# Patient Record
Sex: FEMALE | Race: WHITE | Employment: OTHER | URBAN - METROPOLITAN AREA
[De-identification: names, ages, dates, MRNs, and addresses within clinical notes are randomized per-mention and may not be internally consistent; named-entity substitution may affect disease eponyms.]

---

## 2017-06-21 PROBLEM — M65.312 TRIGGER FINGER OF LEFT THUMB: Status: ACTIVE | Noted: 2017-06-21

## 2017-12-29 PROBLEM — M48.062 SPINAL STENOSIS OF LUMBAR REGION WITH NEUROGENIC CLAUDICATION: Status: ACTIVE | Noted: 2017-12-29

## 2018-01-17 PROBLEM — M65.311 TRIGGER THUMB, RIGHT THUMB: Status: ACTIVE | Noted: 2018-01-17

## 2018-03-21 ENCOUNTER — HOSPITAL ENCOUNTER (OUTPATIENT)
Dept: GENERAL RADIOLOGY | Facility: HOSPITAL | Age: 83
Discharge: HOME | End: 2018-03-21
Attending: ORTHOPAEDIC SURGERY
Payer: MEDICARE

## 2018-03-21 DIAGNOSIS — M25.552 PAIN OF LEFT HIP JOINT: ICD-10-CM

## 2018-03-21 DIAGNOSIS — M16.12 PRIMARY OSTEOARTHRITIS OF LEFT HIP: ICD-10-CM

## 2018-03-21 PROCEDURE — 20610 DRAIN/INJ JOINT/BURSA W/O US: CPT | Performed by: ORTHOPAEDIC SURGERY

## 2018-03-21 PROCEDURE — 77002 NEEDLE LOCALIZATION BY XRAY: CPT | Performed by: ORTHOPAEDIC SURGERY

## 2018-03-21 RX ORDER — METHYLPREDNISOLONE ACETATE 80 MG/ML
INJECTION, SUSPENSION INTRA-ARTICULAR; INTRALESIONAL; INTRAMUSCULAR; SOFT TISSUE
Status: COMPLETED
Start: 2018-03-21 | End: 2018-03-21

## 2018-03-21 RX ADMIN — METHYLPREDNISOLONE ACETATE 80 MG: 80 INJECTION, SUSPENSION INTRA-ARTICULAR; INTRALESIONAL; INTRAMUSCULAR; SOFT TISSUE at 11:00:00

## 2018-03-21 NOTE — PROCEDURES
TECHNIQUE:  An injection was performed in the usual sterile manner after obtaining informed consent. PATIENT STATED HISTORY: (As transcribed by Technologist)  Patient has had ongoing pain in her left hip.   She had a right hip replacement and her right

## 2018-04-03 PROBLEM — M65.311 TRIGGER THUMB, RIGHT THUMB: Status: RESOLVED | Noted: 2018-01-17 | Resolved: 2018-04-03

## 2018-06-28 PROBLEM — M47.816 LUMBAR SPONDYLOSIS: Status: ACTIVE | Noted: 2018-06-28

## 2018-06-28 PROBLEM — M54.16 LUMBAR RADICULITIS: Status: ACTIVE | Noted: 2018-06-28

## 2018-06-28 PROBLEM — Z98.1 S/P LAMINECTOMY WITH SPINAL FUSION: Status: ACTIVE | Noted: 2018-06-28

## 2019-01-14 ENCOUNTER — APPOINTMENT (OUTPATIENT)
Dept: CT IMAGING | Facility: HOSPITAL | Age: 84
End: 2019-01-14
Attending: EMERGENCY MEDICINE
Payer: MEDICARE

## 2019-01-14 ENCOUNTER — HOSPITAL ENCOUNTER (OUTPATIENT)
Facility: HOSPITAL | Age: 84
Setting detail: OBSERVATION
Discharge: HOME HEALTH | End: 2019-01-15
Attending: EMERGENCY MEDICINE | Admitting: INTERNAL MEDICINE
Payer: MEDICARE

## 2019-01-14 DIAGNOSIS — S06.5X9A ACUTE SUBDURAL HEMATOMA (HCC): Primary | ICD-10-CM

## 2019-01-14 LAB
ALBUMIN SERPL-MCNC: 3.7 G/DL (ref 3.1–4.5)
ALBUMIN/GLOB SERPL: 1 {RATIO} (ref 1–2)
ALP LIVER SERPL-CCNC: 75 U/L (ref 55–142)
ALT SERPL-CCNC: 20 U/L (ref 14–54)
ANION GAP SERPL CALC-SCNC: 4 MMOL/L (ref 0–18)
APTT PPP: 28.2 SECONDS (ref 26.1–34.6)
AST SERPL-CCNC: 28 U/L (ref 15–41)
BASOPHILS # BLD AUTO: 0.03 X10(3) UL (ref 0–0.1)
BASOPHILS NFR BLD AUTO: 0.4 %
BILIRUB SERPL-MCNC: 0.4 MG/DL (ref 0.1–2)
BUN BLD-MCNC: 12 MG/DL (ref 8–20)
BUN/CREAT SERPL: 19.7 (ref 10–20)
CALCIUM BLD-MCNC: 9.5 MG/DL (ref 8.3–10.3)
CHLORIDE SERPL-SCNC: 103 MMOL/L (ref 101–111)
CO2 SERPL-SCNC: 30 MMOL/L (ref 22–32)
CREAT BLD-MCNC: 0.61 MG/DL (ref 0.55–1.02)
EOSINOPHIL # BLD AUTO: 0.08 X10(3) UL (ref 0–0.3)
EOSINOPHIL NFR BLD AUTO: 1.1 %
ERYTHROCYTE [DISTWIDTH] IN BLOOD BY AUTOMATED COUNT: 12.9 % (ref 11.5–16)
GLOBULIN PLAS-MCNC: 3.7 G/DL (ref 2.8–4.4)
GLUCOSE BLD-MCNC: 126 MG/DL (ref 70–99)
HCT VFR BLD AUTO: 38.3 % (ref 34–50)
HGB BLD-MCNC: 12.7 G/DL (ref 12–16)
IMMATURE GRANULOCYTE COUNT: 0.02 X10(3) UL (ref 0–1)
IMMATURE GRANULOCYTE RATIO %: 0.3 %
INR BLD: 0.97 (ref 0.9–1.1)
LYMPHOCYTES # BLD AUTO: 1.23 X10(3) UL (ref 0.9–4)
LYMPHOCYTES NFR BLD AUTO: 16.8 %
M PROTEIN MFR SERPL ELPH: 7.4 G/DL (ref 6.4–8.2)
MCH RBC QN AUTO: 32.3 PG (ref 27–33.2)
MCHC RBC AUTO-ENTMCNC: 33.2 G/DL (ref 31–37)
MCV RBC AUTO: 97.5 FL (ref 81–100)
MONOCYTES # BLD AUTO: 0.59 X10(3) UL (ref 0.1–1)
MONOCYTES NFR BLD AUTO: 8 %
NEUTROPHIL ABS PRELIM: 5.39 X10 (3) UL (ref 1.3–6.7)
NEUTROPHILS # BLD AUTO: 5.39 X10(3) UL (ref 1.3–6.7)
NEUTROPHILS NFR BLD AUTO: 73.4 %
OSMOLALITY SERPL CALC.SUM OF ELEC: 285 MOSM/KG (ref 275–295)
PLATELET # BLD AUTO: 180 10(3)UL (ref 150–450)
POTASSIUM SERPL-SCNC: 3.9 MMOL/L (ref 3.6–5.1)
PSA SERPL DL<=0.01 NG/ML-MCNC: 13.3 SECONDS (ref 12.4–14.7)
RBC # BLD AUTO: 3.93 X10(6)UL (ref 3.8–5.1)
RED CELL DISTRIBUTION WIDTH-SD: 46.2 FL (ref 35.1–46.3)
SODIUM SERPL-SCNC: 137 MMOL/L (ref 136–144)
WBC # BLD AUTO: 7.3 X10(3) UL (ref 4–13)

## 2019-01-14 PROCEDURE — 72125 CT NECK SPINE W/O DYE: CPT | Performed by: EMERGENCY MEDICINE

## 2019-01-14 PROCEDURE — 70450 CT HEAD/BRAIN W/O DYE: CPT | Performed by: EMERGENCY MEDICINE

## 2019-01-14 RX ORDER — FLUOROMETHOLONE 0.1 %
1 SUSPENSION, DROPS(FINAL DOSAGE FORM)(ML) OPHTHALMIC (EYE) NIGHTLY
Status: DISCONTINUED | OUTPATIENT
Start: 2019-01-14 | End: 2019-01-15

## 2019-01-14 RX ORDER — GABAPENTIN 300 MG/1
300 CAPSULE ORAL 3 TIMES DAILY
COMMUNITY
End: 2019-01-23

## 2019-01-14 RX ORDER — OXYBUTYNIN CHLORIDE 5 MG/1
10 TABLET ORAL DAILY
COMMUNITY
End: 2019-07-17

## 2019-01-14 RX ORDER — METOCLOPRAMIDE HYDROCHLORIDE 5 MG/ML
5 INJECTION INTRAMUSCULAR; INTRAVENOUS EVERY 8 HOURS PRN
Status: DISCONTINUED | OUTPATIENT
Start: 2019-01-14 | End: 2019-01-15

## 2019-01-14 RX ORDER — ATORVASTATIN CALCIUM 10 MG/1
10 TABLET, FILM COATED ORAL NIGHTLY
Status: DISCONTINUED | OUTPATIENT
Start: 2019-01-14 | End: 2019-01-15

## 2019-01-14 RX ORDER — MECLIZINE HCL 12.5 MG/1
12.5 TABLET ORAL 3 TIMES DAILY PRN
COMMUNITY
End: 2019-03-25

## 2019-01-14 RX ORDER — SODIUM CHLORIDE 9 MG/ML
INJECTION, SOLUTION INTRAVENOUS CONTINUOUS
Status: DISCONTINUED | OUTPATIENT
Start: 2019-01-14 | End: 2019-01-15

## 2019-01-14 RX ORDER — BENAZEPRIL HYDROCHLORIDE 20 MG/1
20 TABLET ORAL DAILY
COMMUNITY
End: 2019-06-18

## 2019-01-14 RX ORDER — GABAPENTIN 300 MG/1
300 CAPSULE ORAL 3 TIMES DAILY
Status: DISCONTINUED | OUTPATIENT
Start: 2019-01-14 | End: 2019-01-15

## 2019-01-14 RX ORDER — ACETAMINOPHEN 325 MG/1
650 TABLET ORAL EVERY 6 HOURS PRN
Status: DISCONTINUED | OUTPATIENT
Start: 2019-01-14 | End: 2019-01-15

## 2019-01-14 RX ORDER — FLUOROMETHOLONE 0.1 %
1 SUSPENSION, DROPS(FINAL DOSAGE FORM)(ML) OPHTHALMIC (EYE) NIGHTLY
COMMUNITY
End: 2020-08-26

## 2019-01-14 RX ORDER — LOVASTATIN 40 MG/1
40 TABLET ORAL NIGHTLY
COMMUNITY
End: 2019-03-21

## 2019-01-14 RX ORDER — AMLODIPINE BESYLATE 10 MG/1
10 TABLET ORAL DAILY
COMMUNITY
End: 2019-03-21

## 2019-01-14 RX ORDER — OXYBUTYNIN CHLORIDE 5 MG/1
10 TABLET ORAL DAILY
Status: DISCONTINUED | OUTPATIENT
Start: 2019-01-15 | End: 2019-01-15

## 2019-01-14 RX ORDER — ONDANSETRON 2 MG/ML
4 INJECTION INTRAMUSCULAR; INTRAVENOUS EVERY 6 HOURS PRN
Status: DISCONTINUED | OUTPATIENT
Start: 2019-01-14 | End: 2019-01-15

## 2019-01-14 NOTE — ED PROVIDER NOTES
Patient Seen in: [facility] Emergency Department    History   Patient presents with:  Fall (musculoskeletal, neurologic)    Stated Complaint: Mechanical fall, +hit head, denies loc, denies n/v, denies blood thinners    HPI    Patient is a 89-year-ol 7/2/2018    Performed by Kaitlyn Campos MD at [address] [facility],[address] N/A 11/9/2018    Performed by Kaitlyn Campos MD at [address] [place] [address] 7/30/2018    Performed by Bonilla Rangel MD at [address] [facility] normal. Pupils are equal, round, and reactive to light. Neck: Normal range of motion. Neck supple. No midline cervical spine tenderness to palpation. Cardiovascular: Normal rate, regular rhythm and normal heart sounds.    Pulmonary/Chest: Effort tyrese Updated 6:05 PM.  CT unfortunately demonstrates a 3 mm acute subdural hematoma on the right side. There is no shift. Patient will need to be admitted for close observation and repeat scan in the morning. Discussed with her and family at bedside.

## 2019-01-14 NOTE — ED INITIAL ASSESSMENT (HPI)
Pt reports her cane slipped out from her and she fell hitting her head. No loc. Pt reports hitting the front of her head. Pt fell forward. Aox4. Walks with cane.

## 2019-01-15 ENCOUNTER — APPOINTMENT (OUTPATIENT)
Dept: CT IMAGING | Facility: HOSPITAL | Age: 84
End: 2019-01-15
Attending: NURSE PRACTITIONER
Payer: MEDICARE

## 2019-01-15 VITALS
DIASTOLIC BLOOD PRESSURE: 53 MMHG | OXYGEN SATURATION: 96 % | WEIGHT: 112.44 LBS | BODY MASS INDEX: 23 KG/M2 | HEART RATE: 63 BPM | TEMPERATURE: 99 F | RESPIRATION RATE: 17 BRPM | SYSTOLIC BLOOD PRESSURE: 127 MMHG

## 2019-01-15 LAB
ANION GAP SERPL CALC-SCNC: 6 MMOL/L (ref 0–18)
BASOPHILS # BLD AUTO: 0.01 X10(3) UL (ref 0–0.1)
BASOPHILS # BLD AUTO: 0.02 X10(3) UL (ref 0–0.1)
BASOPHILS NFR BLD AUTO: 0.2 %
BASOPHILS NFR BLD AUTO: 0.4 %
BUN BLD-MCNC: 11 MG/DL (ref 8–20)
BUN/CREAT SERPL: 25.6 (ref 10–20)
CALCIUM BLD-MCNC: 7.8 MG/DL (ref 8.3–10.3)
CHLORIDE SERPL-SCNC: 107 MMOL/L (ref 101–111)
CHOLEST SMN-MCNC: 122 MG/DL (ref ?–200)
CO2 SERPL-SCNC: 28 MMOL/L (ref 22–32)
CREAT BLD-MCNC: 0.43 MG/DL (ref 0.55–1.02)
EOSINOPHIL # BLD AUTO: 0.09 X10(3) UL (ref 0–0.3)
EOSINOPHIL # BLD AUTO: 0.1 X10(3) UL (ref 0–0.3)
EOSINOPHIL NFR BLD AUTO: 1.7 %
EOSINOPHIL NFR BLD AUTO: 2.1 %
ERYTHROCYTE [DISTWIDTH] IN BLOOD BY AUTOMATED COUNT: 13.2 % (ref 11.5–16)
ERYTHROCYTE [DISTWIDTH] IN BLOOD BY AUTOMATED COUNT: 13.2 % (ref 11.5–16)
EST. AVERAGE GLUCOSE BLD GHB EST-MCNC: 126 MG/DL (ref 68–126)
GLUCOSE BLD-MCNC: 109 MG/DL (ref 70–99)
HBA1C MFR BLD HPLC: 6 % (ref ?–5.7)
HCT VFR BLD AUTO: 29.1 % (ref 34–50)
HCT VFR BLD AUTO: 32.5 % (ref 34–50)
HDLC SERPL-MCNC: 74 MG/DL (ref 40–59)
HGB BLD-MCNC: 11 G/DL (ref 12–16)
HGB BLD-MCNC: 9.8 G/DL (ref 12–16)
IMMATURE GRANULOCYTE COUNT: 0.02 X10(3) UL (ref 0–1)
IMMATURE GRANULOCYTE COUNT: 0.02 X10(3) UL (ref 0–1)
IMMATURE GRANULOCYTE RATIO %: 0.4 %
IMMATURE GRANULOCYTE RATIO %: 0.4 %
LDLC SERPL CALC-MCNC: 42 MG/DL (ref ?–100)
LYMPHOCYTES # BLD AUTO: 1.4 X10(3) UL (ref 0.9–4)
LYMPHOCYTES # BLD AUTO: 1.67 X10(3) UL (ref 0.9–4)
LYMPHOCYTES NFR BLD AUTO: 29.1 %
LYMPHOCYTES NFR BLD AUTO: 31.5 %
MCH RBC QN AUTO: 33.3 PG (ref 27–33.2)
MCH RBC QN AUTO: 34 PG (ref 27–33.2)
MCHC RBC AUTO-ENTMCNC: 33.7 G/DL (ref 31–37)
MCHC RBC AUTO-ENTMCNC: 33.8 G/DL (ref 31–37)
MCV RBC AUTO: 100.3 FL (ref 81–100)
MCV RBC AUTO: 99 FL (ref 81–100)
MONOCYTES # BLD AUTO: 0.68 X10(3) UL (ref 0.1–1)
MONOCYTES # BLD AUTO: 0.71 X10(3) UL (ref 0.1–1)
MONOCYTES NFR BLD AUTO: 13.4 %
MONOCYTES NFR BLD AUTO: 14.1 %
NEUTROPHIL ABS PRELIM: 2.6 X10 (3) UL (ref 1.3–6.7)
NEUTROPHIL ABS PRELIM: 2.8 X10 (3) UL (ref 1.3–6.7)
NEUTROPHILS # BLD AUTO: 2.6 X10(3) UL (ref 1.3–6.7)
NEUTROPHILS # BLD AUTO: 2.8 X10(3) UL (ref 1.3–6.7)
NEUTROPHILS NFR BLD AUTO: 52.6 %
NEUTROPHILS NFR BLD AUTO: 54.1 %
NONHDLC SERPL-MCNC: 48 MG/DL (ref ?–130)
OSMOLALITY SERPL CALC.SUM OF ELEC: 292 MOSM/KG (ref 275–295)
PLATELET # BLD AUTO: 134 10(3)UL (ref 150–450)
PLATELET # BLD AUTO: 154 10(3)UL (ref 150–450)
POTASSIUM SERPL-SCNC: 3.6 MMOL/L (ref 3.6–5.1)
POTASSIUM SERPL-SCNC: 4.4 MMOL/L (ref 3.6–5.1)
RBC # BLD AUTO: 2.94 X10(6)UL (ref 3.8–5.1)
RBC # BLD AUTO: 3.24 X10(6)UL (ref 3.8–5.1)
RED CELL DISTRIBUTION WIDTH-SD: 47.2 FL (ref 35.1–46.3)
RED CELL DISTRIBUTION WIDTH-SD: 49.1 FL (ref 35.1–46.3)
SODIUM SERPL-SCNC: 141 MMOL/L (ref 136–144)
TRIGL SERPL-MCNC: 28 MG/DL (ref 30–149)
TROPONIN I SERPL-MCNC: <0.046 NG/ML (ref ?–0.05)
VLDLC SERPL CALC-MCNC: 6 MG/DL (ref 0–30)
WBC # BLD AUTO: 4.8 X10(3) UL (ref 4–13)
WBC # BLD AUTO: 5.3 X10(3) UL (ref 4–13)

## 2019-01-15 PROCEDURE — 99291 CRITICAL CARE FIRST HOUR: CPT | Performed by: NURSE PRACTITIONER

## 2019-01-15 PROCEDURE — 99205 OFFICE O/P NEW HI 60 MIN: CPT | Performed by: OTHER

## 2019-01-15 PROCEDURE — 70450 CT HEAD/BRAIN W/O DYE: CPT | Performed by: NURSE PRACTITIONER

## 2019-01-15 RX ORDER — DOCUSATE SODIUM 100 MG/1
100 CAPSULE, LIQUID FILLED ORAL 2 TIMES DAILY
Status: DISCONTINUED | OUTPATIENT
Start: 2019-01-15 | End: 2019-01-15

## 2019-01-15 RX ORDER — SENNA PLUS 8.6 MG/1
8.6 TABLET ORAL 2 TIMES DAILY
Qty: 30 TABLET | Refills: 0 | Status: SHIPPED | OUTPATIENT
Start: 2019-01-15 | End: 2019-04-05

## 2019-01-15 RX ORDER — LORAZEPAM 2 MG/ML
1 INJECTION INTRAMUSCULAR
Status: DISCONTINUED | OUTPATIENT
Start: 2019-01-15 | End: 2019-01-15

## 2019-01-15 RX ORDER — LABETALOL HYDROCHLORIDE 5 MG/ML
10 INJECTION, SOLUTION INTRAVENOUS EVERY 10 MIN PRN
Status: DISCONTINUED | OUTPATIENT
Start: 2019-01-15 | End: 2019-01-15

## 2019-01-15 RX ORDER — SENNOSIDES 8.6 MG
8.6 TABLET ORAL 2 TIMES DAILY
Status: DISCONTINUED | OUTPATIENT
Start: 2019-01-15 | End: 2019-01-15

## 2019-01-15 RX ORDER — BISACODYL 10 MG
10 SUPPOSITORY, RECTAL RECTAL
Status: DISCONTINUED | OUTPATIENT
Start: 2019-01-15 | End: 2019-01-15

## 2019-01-15 RX ORDER — SODIUM PHOSPHATE, DIBASIC AND SODIUM PHOSPHATE, MONOBASIC 7; 19 G/133ML; G/133ML
1 ENEMA RECTAL ONCE AS NEEDED
Status: DISCONTINUED | OUTPATIENT
Start: 2019-01-15 | End: 2019-01-15

## 2019-01-15 RX ORDER — POLYETHYLENE GLYCOL 3350 17 G/17G
17 POWDER, FOR SOLUTION ORAL DAILY PRN
Status: DISCONTINUED | OUTPATIENT
Start: 2019-01-15 | End: 2019-01-15

## 2019-01-15 RX ORDER — POTASSIUM CHLORIDE 20 MEQ/1
40 TABLET, EXTENDED RELEASE ORAL EVERY 4 HOURS
Status: COMPLETED | OUTPATIENT
Start: 2019-01-15 | End: 2019-01-15

## 2019-01-15 RX ORDER — PHENYLEPHRINE HCL IN 0.9% NACL 50MG/250ML
PLASTIC BAG, INJECTION (ML) INTRAVENOUS CONTINUOUS PRN
Status: DISCONTINUED | OUTPATIENT
Start: 2019-01-15 | End: 2019-01-15

## 2019-01-15 NOTE — DISCHARGE SUMMARY
General Medicine Discharge Summary     Patient ID:  Yara Rodriguez May  80year old  11/17/1925    Admit date: 1/14/2019    Discharge date and time: 1/15/2019  4:00 PM     Attending Physician: Huan Musa MD    Primary Care Physician: Roxy Montesinos MD daily., Normal, Disp-30 tablet, R-0      CONTINUE these medications which have NOT CHANGED    fluorometholone 0.1 % Ophthalmic Suspension  Place 1 drop into the right eye nightly., Historical    Oxybutynin Chloride 5 MG Oral Tab  Take 10 mg by mouth daily.

## 2019-01-15 NOTE — PROGRESS NOTES
[facility] Hospitalist Progress Note     [facility]      SUBJECTIVE:  Patient feeling well this morning  No new headaches or weakness  No new vision changes    OBJECTIVE:  Temp:  [97.1 °F (36.2 °C)-98.4 °F (36.9 °C)] 98.4 °F (36.9 °C)  Pulse:  [59-77] 67 FALL    FINDINGS:   Right frontal subdural hematoma appears smaller, now probably less than 2 mm or less. No new intracranial bleed. Redemonstration atrophy and chronic white matter disease. Decrease in size right posterior scalp hematoma.       Chris Linger white matter ischemic change. The critical value results were called to Dr. Graciela Peter at 20 0 2 hours on  1/14/2019. Result read back was performed.     Dictated by: Krys Bailey MD on 1/14/2019 at 17:57     Approved by: Krys Bailey MD Barb Hoffman MD             Result Date: 1/14/2019  PROCEDURE:  CT SPINE CERVICAL (CPT=72125)  COMPARISON:  None.   INDICATIONS:  Mechanical fall, +hit head, denies loc, denies n/v, denies blood thinners  TECHNIQUE:  Noncontrast CT scanning of the cer (MIRALAX) powder packet 17 g 17 g Oral Daily PRN   magnesium hydroxide (MILK OF MAGNESIA) 400 MG/5ML suspension 30 mL 30 mL Oral Daily PRN   bisacodyl (DULCOLAX) rectal suppository 10 mg 10 mg Rectal Daily PRN   FLEET ENEMA (FLEET) 7-19 GM/118ML enema 133

## 2019-01-15 NOTE — OCCUPATIONAL THERAPY NOTE
OCCUPATIONAL THERAPY EVALUATION - INPATIENT     Room Number: 4014/4455-P  Evaluation Date: 1/15/2019  Type of Evaluation: Initial  Presenting Problem: fall, R SD hematoma    Physician Order: IP Consult to Occupational Therapy  Reason for Therapy: ADL/IADL • HIP REPLACEMENT SURGERY  7/ 2009    R   • INTRAOPERATIVE NEURO MONITORING N/A 11/5/2013    Performed by Dottie Dumont MD at [facility] MAIN OR   • LUMBAR / TRANSFORAMINAL EPIDURAL STEROID INJECTION N/A 7/2/2018    Performed by Dayanara Park MD at [facility] WHITE about that, but I'm proud of it! \"      Patient self-stated goal is to go home     OBJECTIVE  Precautions: Bed/chair alarm(-160, leg length discrepancy- heel lift)  Fall Risk: High fall risk    WEIGHT BEARING RESTRICTION  Weight Bearing Restriction: person does the patient currently need…  -   Putting on and taking off regular lower body clothing?: A Little  -   Bathing (including washing, rinsing, drying)?: A Little  -   Toileting, which includes using toilet, bedpan or urinal? : A Little  -   Puttin rationale. Daughter stating she thinks above deficits are related to pt not having glasses/hearing aides present for evaluation. Pt unable to state why she didn't want her glasses for eval; states she always wears them at home.  Explained in detail findings safety awareness, noticing/responding to errors, problem solving, balance.  These deficits impact the patient’s ability to participate in ADL, transfers, instrumental activities of daily living, rest and sleep, community mobility, leisure and social partici will transfer form stand to sit:  with modified independent  Patient will transfer to toilet:  with modified independent    Additional Goals:  Pt will complete trailmaking test

## 2019-01-15 NOTE — PHYSICAL THERAPY NOTE
PHYSICAL THERAPY EVALUATION - INPATIENT     Room Number: 0777/7231-U  Evaluation Date: 1/15/2019  Type of Evaluation: Initial  Physician Order: PT Eval and Treat    Presenting Problem: s/p fall, R SDH  Reason for Therapy: Mobility Dysfunction and Dis LUMBAR EPIDURAL N/A 7/30/2018    Performed by Evette Lake MD at [address] 7/16/2018    Performed by Toshia Short MD at [address]   • OTHER      13 biopsies on both breasts   • OTHER S for safety  · Awareness of Errors:  decreased awareness of errors     RANGE OF MOTION AND STRENGTH ASSESSMENT  Upper extremity ROM and strength- see OT eval    Lower extremity ROM is within functional limits     Lower extremity strength is within functiona Pt also requires cueing to zip shoes prior to getting up. Pt unable to recall that she left unzipped. Pt sit-stand sans AD with MIN assist. Pt took a few tentative steps with MIN assist for balance. Pt reaching for objects to hold onto.  Pt provided RW for include [facility]. Based on this evaluation, patient's clinical presentation is stable and overall the evaluation complexity is considered moderate.  These impairments and comorbidities manifest themselves as functional limitations in independent bed mobility,

## 2019-01-15 NOTE — H&P
[facility] Hospitalist H&P       CC: Patient presents with:  Fall (musculoskeletal, neurologic)       PCP: Danial Benavidez MD    History of Present Illness:  Ms. Burr is a 81 yo female with PMH of GERD, HLD, HTN, osteoprososis who presented after a fall in a park MAIN OR   • LUMBAR / TRANSFORAMINAL EPIDURAL STEROID INJECTION N/A 7/2/2018    Performed by Dona Askew MD at [address] [facility],[address] N/A 11/9/2018    Performed by Dona Askew MD at [address]    Wt Readings from Last 3 Encounters:  11/27/18 : 116 lb (52.6 kg)  11/08/18 : 116 lb (52.6 kg)  10/04/18 : 130 lb (59 kg)      Wt Readings from Last 6 Encounters:  11/27/18 : 116 lb (52.6 kg)  11/08/18 : 116 lb (52.6 kg)  10/04/18 : 130 lb (59 kg) midline shift. The basal cisterns are patent. Moderate calcification of the skull base noted. Moderate chronic deep white matter ischemic change in the subcortical white matter of the cerebrum. No acute territorial infarction.   Chronic ischemic change of the cervical lordosis. There is facet arthropathy most severe C2-3 through C4-5 bilaterally. Normal C1-2 articulation. No fracture. CERVICAL DISC LEVELS: There are small annular bulging discs C2-3 through C4-5. No disc herniation or canal stenosis.

## 2019-01-15 NOTE — HOME CARE LIAISON
Met with patient and daughter at the bedside. Agreeable to [facility] services, RN/PT/OT. All questions addressed and answered. Brochure provided.

## 2019-01-15 NOTE — CM/SW NOTE
MSW received protocol order to verify support and assess for dc needs. Per RN, patient is independent and will be discharged home today. No dc planning needs at this time.     Jennifer Sanchez LCSW

## 2019-01-15 NOTE — SLP NOTE
ADULT SWALLOWING EVALUATION    ASSESSMENT    ASSESSMENT/OVERALL IMPRESSION:  Patient seen for swallowing assessment per protocol. Patient admitted after sustaining fall and hitting her head. Imaging revealed right SDH.   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

## 2019-01-15 NOTE — CONSULTS
[facility]  Neurocritical Care       Name: Rohith Burr  MRN: [id number]  Admission Date/Time: 1/14/2019  4:43 PM  Primary Care Provider:  Abraham Narayan MD        Reason for Consultation:   SDH    HPI:   Patient is a 80year old woman w 11/10/2010      Past Medical History:   Diagnosis Date   • Allergic rhinitis, cause unspecified 1/15/2012   • Anemia 1/13/2012   • Anesthesia complication    • BACK PAIN    • Cancer [facility])    • Cyst and pseudocyst of pancreas- on [email or web address] [facility] 9/27/ HISTORY      procedure for spinal stenosis (Dr Reggie Steward)   • POSTERIOR LUMBAR LAMINECT SPINAL FUS W/INSTR 1 LEV N/A 11/5/2013    Performed by Seb Ayala MD at [facility] MAIN OR   • SPINE SURGERY PROCEDURE UNLISTED      noninvasive lumbar surgery   • TOTAL HI [DISCONTINUED] Cranberry-Vitamin C-Vitamin E 140-100-3 MG-MG-UNIT Oral Cap Take 1 tablet by mouth daily. Disp:  Rfl:  1/14/2019 at 0900   [DISCONTINUED] omega-3 fatty acids (FISH OIL) 1000 MG Oral Cap Take 1,000 mg by mouth 2 (two) times daily.  Disp: PRN   bisacodyl (DULCOLAX) rectal suppository 10 mg 10 mg Rectal Daily PRN   FLEET ENEMA (FLEET) 7-19 GM/118ML enema 133 mL 1 enema Rectal Once PRN   LORazepam (ATIVAN) injection 1 mg 1 mg Intravenous Q15 Min PRN   Senna (SENOKOT) tab TABS 8.6 mg 8.6 mg Or subjective weakness  LLE 5/5 throughout  RLE 5/5 throughout  Sensory exam normal to fine touch, temperature and position  Coordination finger to nose normal bilaterally  Gait deferred    Diagnostics:   Ct Brain Or Head (73747)    Result Date: 1/15/2019  CA cisterns are patent. Moderate calcification of the skull base noted. Moderate chronic deep white matter ischemic change in the subcortical white matter of the cerebrum. No acute territorial infarction. Chronic ischemic change in the anterior leah.   No is straightening of the cervical lordosis. There is facet arthropathy most severe C2-3 through C4-5 bilaterally. Normal C1-2 articulation. No fracture. CERVICAL DISC LEVELS: There are small annular bulging discs C2-3 through C4-5.   No disc herniation o narrowing C2-3 through C5-6. The neural foramina are patent. CONCLUSION:  Mild degenerative disc disease. Findings most severe C2-3 through C4-5. No acute fracture.     Dictated by: Renetta Ntetles MD on 1/14/2019 at 18:03     Approved by: Eveline Layton Prophylaxis:  - SCD’s  - No chemical prophylaxis indicated  A total of 115 minutes of care time (exclusive of billable procedures) was administered.  This involved direct patient intervention, complex decision making, and/or extensive discussions with the p

## 2019-01-15 NOTE — CONSULTS
[facility]  Neurosurgery Consult    Dallin Anthony May Patient Status:  Observation    1925 MRN [id number]   [facility] 6NE-A Attending Nieves Graham MD   Hosp Day # 0 PCP Cynthia Moreno MD     REASON FOR CONSULTATION:  SDH • COLONOSCOPY  2008    ild sigmoid diverticulosis and hemorrhoids.    • HAND/FINGER SURGERY UNLISTED Left 10/2017--Dr. Marv Lan    thumb trigger finger release   • HIP REPLACEMENT SURGERY  7/ 2009    R   • INTRAOPERATIVE NEURO MONITORING N/A 11/5/2013    Pe nightly. Disp:  Rfl:  1/13/2019 at 2000   Oxybutynin Chloride 5 MG Oral Tab Take 10 mg by mouth daily. Disp:  Rfl:  1/14/2019 at 0900   AmLODIPine Besylate 10 MG Oral Tab Take 10 mg by mouth daily.  Disp:  Rfl:  1/14/2019 at 0900   gabapentin 300 MG Oral Ca injection 10 mg 10 mg Intravenous Q10 Min PRN   niCARdipine HCl in NaCl (CARDENE) 20 mg/200 ml premix infusion 5-15 mg/hr Intravenous Continuous PRN   phenylephrine in NaCl (SHIRA-SYNEPHRINE) 50 mg/250 ml premix infusion SOLN 100-200 mcg/min Intravenous Cont DATA: Lab Results   Component Value Date    WBC 5.3 01/15/2019    HGB 9.8 01/15/2019    HCT 29.1 01/15/2019    .0 01/15/2019    CREATSERUM 0.43 01/15/2019    BUN 11 01/15/2019     01/15/2019    K 3.6 01/15/2019     01/15/2019    CO2 28.0 through C4-5 bilaterally. Normal C1-2 articulation. No fracture. CERVICAL DISC LEVELS:  There are small annular bulging discs C2-3 through C4-5. No disc herniation or canal stenosis. Mild posterior disc space narrowing C2-3 through C5-6.   The neura

## 2019-01-15 NOTE — CM/SW NOTE
MSW met with the patient and daughter at bedside to discuss PT/OT recommendations for KEVIN. The patient is currently under observation and would be responsible for the cost.  Patient and family are not agreeable to self pay.   They are agreeable to [facility] serv

## 2019-01-15 NOTE — PROGRESS NOTES
[facility]  Neurocritical Care APRN Progress Note    NAME: Jade Burr - ROOM: 53/6566-H - MRN: [id number] - Age: 80year old - :1925    History Of Present Illness:  Jade Burr is a 80year old female with PMHx significant • Diabetes Father    • Hypertension Mother    • Other (Other) Daughter          of neck aneurysm at 47   • Cancer Sister 79        Leukemia       Review of Systems:   A comprehensive 10 point review of systems was completed.   Pertinent positives and lobes.  No midline shift. 2.  Moderate size right parietal scalp hematoma. Moderate chronic deep white matter ischemic change. The critical value results were called to Dr. William Cote at 20 0 2 hours on  1/14/2019. Result read back was performed.     Dict Easton Bowman MD      Dictated by: Easton Bowman MD on 1/14/2019 at 18:27     Approved by: Easton Bowman MD             Result Date: 1/14/2019  CONCLUSION:  Mild degenerative disc disease. Findings most severe C2-3 through C4-5.   No acute f

## 2019-01-15 NOTE — PLAN OF CARE
NEUROLOGICAL - ADULT    • Achieves stable or improved neurological status Adequate for Discharge          Impaired Functional Mobility    • Achieve highest/safest level of mobility/gait Progressing          NEUROLOGICAL - ADULT    • Achieves stable or impr

## 2019-02-06 ENCOUNTER — HOSPITAL ENCOUNTER (OUTPATIENT)
Dept: CT IMAGING | Facility: HOSPITAL | Age: 84
Discharge: HOME | End: 2019-02-06
Attending: NURSE PRACTITIONER
Payer: MEDICARE

## 2019-02-06 DIAGNOSIS — S06.5X9A ACUTE SUBDURAL HEMATOMA (HCC): ICD-10-CM

## 2019-02-06 PROCEDURE — 70450 CT HEAD/BRAIN W/O DYE: CPT | Performed by: NURSE PRACTITIONER

## 2019-02-07 NOTE — PROGRESS NOTES
Please call patient. Not sure who ordered the repeat CT scan. Would want to make sure that physician gets the records. Overall CT scan looks stable. Bleed is about 2mm in size. Once patient contacted, okay to release to patient.   Also, she likely need

## 2019-02-09 NOTE — PROGRESS NOTES
Pt informed of MD notes   States not sure who is the ordering MD  States will have her daughter call our office   Hold   Results released to my chart.

## 2019-02-11 NOTE — PROGRESS NOTES
Pt daughter calling states Neurology at the hospital did order the test   Pt daughter states dr Trevor Flowers Neurology have the results already   Pt have appt to follow up with Dr Janeth Jarquin to PCP

## 2019-02-19 ENCOUNTER — OFFICE VISIT (OUTPATIENT)
Dept: SURGERY | Facility: CLINIC | Age: 84
End: 2019-02-19
Payer: MEDICARE

## 2019-02-19 VITALS
HEART RATE: 74 BPM | SYSTOLIC BLOOD PRESSURE: 120 MMHG | DIASTOLIC BLOOD PRESSURE: 60 MMHG | BODY MASS INDEX: 23.39 KG/M2 | HEIGHT: 59 IN | WEIGHT: 116 LBS

## 2019-02-19 DIAGNOSIS — S06.5X9A ACUTE SUBDURAL HEMATOMA (HCC): Primary | ICD-10-CM

## 2019-02-19 PROCEDURE — 99214 OFFICE O/P EST MOD 30 MIN: CPT | Performed by: PHYSICIAN ASSISTANT

## 2019-02-19 NOTE — PATIENT INSTRUCTIONS
Refill policies:    • Allow 2-3 business days for refills; controlled substances may take longer.   • Contact your pharmacy at least 5 days prior to running out of medication and have them send an electronic request or submit request through the “request re been approved by your insurer. Depending on your insurance carrier, approval may take 3-10 days. It is highly recommended patients contact their insurance carrier directly to determine coverage.   If test is done without insurance authorization, patient ma for her neck, and injections and procedures with Dr. Nighat Ibarra  3. Imaging: Consider CT of the head in 2 months or if she is asymptomatic he can cancel the scan  4.  Activity: As tolerated, she had no seizures in the hospital she can resume driving when she fe

## 2019-02-19 NOTE — PROGRESS NOTES
Pt is here for hospital follow up for Acute subdural hematoma. Pt states that she just repots soreness.    Hospital date: 1/14/19

## 2019-02-19 NOTE — PROGRESS NOTES
NEUROSURGERY  POSTOP CRANIAL FOLLOW UP    REASON FOR VISIT: Acute right subdural hematoma secondary to fall on or about 1/14/19    HISTORY OF PRESENT ILLNESS:Kelly Burr is a 80year old female here in follow-up from hospital admission from 1/14/19.   Magy as needed. Disp:  Rfl:    Probiotic Product (PROBIOTIC-10) Oral Cap Take 1 capsule by mouth daily. Disp:  Rfl:    docusate sodium (STOOL SOFTENER) 100 MG Oral Cap Take 200 mg by mouth daily.  Disp:  Rfl:    denosumab (PROLIA) 60 MG/ML Subcutaneous Solutio laminectomy and posterior spinal fusion 11/5/13 Dr. Hermes Sigala  5. L3-4 spinal stenosis    PLAN:  1. Follow up 2 months or as needed  2. She may resume chiropractic care for her neck, and injections and procedures with Dr. Yashira Momin  3.  Imaging: Consider CT

## 2019-06-20 PROCEDURE — 87086 URINE CULTURE/COLONY COUNT: CPT | Performed by: FAMILY MEDICINE

## 2019-07-17 PROBLEM — Z86.79 HISTORY OF SUBDURAL HEMATOMA: Status: ACTIVE | Noted: 2019-07-17

## 2019-07-17 PROCEDURE — 84165 PROTEIN E-PHORESIS SERUM: CPT | Performed by: OTHER

## 2019-07-17 PROCEDURE — 83883 ASSAY NEPHELOMETRY NOT SPEC: CPT | Performed by: OTHER

## 2019-07-17 PROCEDURE — 86334 IMMUNOFIX E-PHORESIS SERUM: CPT | Performed by: OTHER

## 2019-07-17 PROCEDURE — 83921 ORGANIC ACID SINGLE QUANT: CPT | Performed by: OTHER

## 2019-07-17 PROCEDURE — 84425 ASSAY OF VITAMIN B-1: CPT | Performed by: OTHER

## 2019-07-17 PROCEDURE — 86618 LYME DISEASE ANTIBODY: CPT | Performed by: OTHER

## 2019-08-14 ENCOUNTER — OFFICE VISIT (OUTPATIENT)
Dept: FAMILY MEDICINE CLINIC | Facility: CLINIC | Age: 84
End: 2019-08-14
Payer: MEDICARE

## 2019-08-14 DIAGNOSIS — Z11.1 SCREENING FOR TUBERCULOSIS: Primary | ICD-10-CM

## 2019-08-14 PROCEDURE — 86580 TB INTRADERMAL TEST: CPT | Performed by: FAMILY MEDICINE

## 2019-08-14 NOTE — PATIENT INSTRUCTIONS
You will need to return to clinic in 48-72 hours to have results of TB test read. Please return to clinic on 8/16 after 11:30 AM or on 8/17 before 11:30 AM to have your TB test read.     If you do not return during this time your test will need to be re

## 2019-08-14 NOTE — PROGRESS NOTES
Pt here for tb testing for senior day care. No hx of tb testing in past.   Pt and dtr are aware of need to return in 48-72 hours.

## 2019-08-16 ENCOUNTER — OFFICE VISIT (OUTPATIENT)
Dept: FAMILY MEDICINE CLINIC | Facility: CLINIC | Age: 84
End: 2019-08-16
Payer: MEDICARE

## 2019-08-16 DIAGNOSIS — Z11.1 ENCOUNTER FOR PPD SKIN TEST READING: Primary | ICD-10-CM

## 2019-08-16 LAB — INDURATION (): 0 MM (ref 0–11)

## 2019-08-16 NOTE — PROGRESS NOTES
Results for orders placed or performed in visit on 08/14/19   TB INTRADERMAL TEST    Collection Time: 08/16/19  2:02 PM   Result Value Ref Range    Date Given: 08/14/19     Site: left forarm     INDURATION (PPD) 0.0 0.0 - 11 mm     Tb reading negative: 0 m

## 2020-08-27 PROBLEM — F03.91 DEMENTIA WITH BEHAVIORAL DISTURBANCE, UNSPECIFIED DEMENTIA TYPE (HCC): Status: ACTIVE | Noted: 2020-08-27

## 2020-08-27 PROBLEM — S06.5X9A ACUTE SUBDURAL HEMATOMA (HCC): Status: RESOLVED | Noted: 2019-01-14 | Resolved: 2020-08-27

## 2021-09-24 PROBLEM — G30.1 ALZHEIMER'S TYPE DEMENTIA WITH LATE ONSET WITHOUT BEHAVIORAL DISTURBANCE (HCC): Status: ACTIVE | Noted: 2021-09-24

## 2021-09-24 PROBLEM — Z91.81 RISK FOR FALLS: Status: ACTIVE | Noted: 2021-09-24

## 2021-09-24 PROBLEM — F02.80 ALZHEIMER'S TYPE DEMENTIA WITH LATE ONSET WITHOUT BEHAVIORAL DISTURBANCE (HCC): Status: ACTIVE | Noted: 2021-09-24

## (undated) NOTE — LETTER
08/16/19 08/16/19      This document is to verify Gilda Burr had a TB skin test performed and the results were: NEGATIVE  MA: Radha steve    Date given:08/13/2019  Date read: 08/16/19        Please call the number listed above if you have further que